# Patient Record
Sex: FEMALE | Race: WHITE | Employment: UNEMPLOYED | URBAN - METROPOLITAN AREA
[De-identification: names, ages, dates, MRNs, and addresses within clinical notes are randomized per-mention and may not be internally consistent; named-entity substitution may affect disease eponyms.]

---

## 2020-12-01 ENCOUNTER — HOSPITAL ENCOUNTER (OUTPATIENT)
Dept: LAB | Age: 50
Discharge: HOME | End: 2020-12-01

## 2020-12-01 PROCEDURE — 88312 SPECIAL STAINS GROUP 1: CPT

## 2020-12-01 PROCEDURE — 88305 TISSUE EXAM BY PATHOLOGIST: CPT

## 2021-09-07 ENCOUNTER — HOSPITAL ENCOUNTER (OUTPATIENT)
Dept: ULTRASOUND IMAGING | Age: 51
Discharge: HOME | End: 2021-09-07
Attending: UROLOGY
Payer: MEDICARE

## 2021-09-07 DIAGNOSIS — N28.1 RENAL CYST, RIGHT: ICD-10-CM

## 2021-09-07 PROCEDURE — 76770 US EXAM ABDO BACK WALL COMP: CPT

## 2022-07-09 ENCOUNTER — TELEPHONE ENCOUNTER (OUTPATIENT)
Dept: URBAN - METROPOLITAN AREA CLINIC 121 | Facility: CLINIC | Age: 52
End: 2022-07-09

## 2022-07-09 RX ORDER — LAMOTRIGINE 100 MG/1
TABLET ORAL
Refills: 0 | OUTPATIENT
Start: 2017-03-20 | End: 2017-05-11

## 2022-07-09 RX ORDER — LURASIDONE HYDROCHLORIDE 60 MG/1
TABLET, FILM COATED ORAL ONCE A DAY
Refills: 0 | OUTPATIENT
Start: 2017-03-20 | End: 2017-05-11

## 2022-07-09 RX ORDER — PROPRANOLOL HYDROCHLORIDE 20 MG/1
TABLET ORAL TWICE A DAY
Refills: 0 | OUTPATIENT
Start: 2016-01-08 | End: 2016-03-07

## 2022-07-09 RX ORDER — LAMOTRIGINE 100 MG/1
TABLET ORAL
Refills: 0 | OUTPATIENT
Start: 2016-03-07 | End: 2017-03-20

## 2022-07-09 RX ORDER — ZOLPIDEM TARTRATE 10 MG
1 HS TABLET ORAL ONCE A DAY
Refills: 0 | OUTPATIENT
Start: 2017-05-11 | End: 2017-06-28

## 2022-07-09 RX ORDER — PROMETHAZINE HYDROCHLORIDE 12.5 MG/1
TAKE ONE TABLET PO TWICE A DAY AS NEEDED TABLET ORAL TWICE A DAY
Refills: 0 | OUTPATIENT
Start: 2016-01-08 | End: 2017-06-28

## 2022-07-09 RX ORDER — PROPRANOLOL HYDROCHLORIDE 20 MG/1
TABLET ORAL TWICE A DAY
Refills: 0 | OUTPATIENT
Start: 2017-03-20 | End: 2017-06-28

## 2022-07-09 RX ORDER — LAMOTRIGINE 300 MG/1
TABLET, FILM COATED, EXTENDED RELEASE ORAL ONCE A DAY
Refills: 0 | OUTPATIENT
Start: 2016-01-08 | End: 2016-03-07

## 2022-07-09 RX ORDER — HYDROXYZINE PAMOATE 50 MG/1
CAPSULE ORAL AS NEEDED
Refills: 0 | OUTPATIENT
Start: 2017-03-20 | End: 2017-05-11

## 2022-07-09 RX ORDER — HYDROXYZINE PAMOATE 50 MG/1
CAPSULE ORAL THREE TIMES A DAY
Refills: 0 | OUTPATIENT
Start: 2017-05-11 | End: 2017-06-28

## 2022-07-09 RX ORDER — CHLORDIAZEPOXIDE HYDROCHLORIDE AND CLIDINIUM BROMIDE 5; 2.5 MG/1; MG/1
TAKE ONE CAPSULE PO TWICE A DAY CAPSULE, GELATIN COATED ORAL TWICE A DAY
Refills: 0 | OUTPATIENT
Start: 2016-01-08 | End: 2016-01-08

## 2022-07-09 RX ORDER — ALBUTEROL SULFATE 108 UG/1
AEROSOL, METERED RESPIRATORY (INHALATION) AS NEEDED
Refills: 0 | OUTPATIENT
Start: 2017-05-11 | End: 2017-06-28

## 2022-07-09 RX ORDER — ZOLPIDEM TARTRATE 10 MG
1 HS TABLET ORAL
Refills: 0 | OUTPATIENT
Start: 2017-03-20 | End: 2017-05-11

## 2022-07-09 RX ORDER — FLUTICASONE PROPIONATE 50 UG/1
SPRAY, METERED NASAL ONCE A DAY
Refills: 0 | OUTPATIENT
Start: 2017-05-11 | End: 2017-06-28

## 2022-07-09 RX ORDER — CHLORTHALIDONE 25 MG/1
TABLET ORAL ONCE A DAY
Refills: 0 | OUTPATIENT
Start: 2017-05-11 | End: 2017-06-28

## 2022-07-09 RX ORDER — ZOLPIDEM TARTRATE 10 MG
1 HS TABLET ORAL
Refills: 0 | OUTPATIENT
Start: 2016-03-07 | End: 2017-03-20

## 2022-07-09 RX ORDER — LAMOTRIGINE 300 MG/1
TABLET, FILM COATED, EXTENDED RELEASE ORAL ONCE A DAY
Refills: 0 | OUTPATIENT
Start: 2015-10-30 | End: 2016-01-08

## 2022-07-09 RX ORDER — LAMOTRIGINE 300 MG/1
TABLET, FILM COATED, EXTENDED RELEASE ORAL ONCE A DAY
Refills: 0 | OUTPATIENT
Start: 2016-03-07 | End: 2017-03-20

## 2022-07-09 RX ORDER — DICYCLOMINE HYDROCHLORIDE 20 MG/1
TAKE ONE TABLET PO FOUR TIMES A DAY TABLET ORAL
Refills: 0 | OUTPATIENT
Start: 2016-05-09 | End: 2017-03-20

## 2022-07-09 RX ORDER — ZOLPIDEM TARTRATE 10 MG
1 HS TABLET ORAL
Refills: 0 | OUTPATIENT
Start: 2016-01-08 | End: 2016-03-07

## 2022-07-09 RX ORDER — LAMOTRIGINE 100 MG/1
TABLET ORAL
Refills: 0 | OUTPATIENT
Start: 2016-01-08 | End: 2016-03-07

## 2022-07-09 RX ORDER — LAMOTRIGINE 300 MG/1
TABLET, FILM COATED, EXTENDED RELEASE ORAL ONCE A DAY
Refills: 0 | OUTPATIENT
Start: 2017-03-20 | End: 2017-03-20

## 2022-07-09 RX ORDER — PROPRANOLOL HYDROCHLORIDE 20 MG/1
TABLET ORAL TWICE A DAY
Refills: 0 | OUTPATIENT
Start: 2015-10-30 | End: 2016-01-08

## 2022-07-09 RX ORDER — LURASIDONE HYDROCHLORIDE 60 MG/1
TABLET, FILM COATED ORAL ONCE A DAY
Refills: 0 | OUTPATIENT
Start: 2017-05-11 | End: 2017-06-28

## 2022-07-09 RX ORDER — CLONAZEPAM 0.5 MG/1
TABLET ORAL ONCE A DAY
Refills: 0 | OUTPATIENT
Start: 2017-03-20 | End: 2017-05-11

## 2022-07-09 RX ORDER — PROPRANOLOL HYDROCHLORIDE 20 MG/1
TABLET ORAL TWICE A DAY
Refills: 0 | OUTPATIENT
Start: 2016-03-07 | End: 2017-03-20

## 2022-07-09 RX ORDER — CLONAZEPAM 0.5 MG/1
TABLET ORAL THREE TIMES A DAY
Refills: 0 | OUTPATIENT
Start: 2017-05-11 | End: 2017-06-28

## 2022-07-09 RX ORDER — LAMOTRIGINE 200 MG/1
TABLET ORAL TWICE A DAY
Refills: 0 | OUTPATIENT
Start: 2017-05-11 | End: 2017-06-28

## 2022-07-10 ENCOUNTER — TELEPHONE ENCOUNTER (OUTPATIENT)
Dept: URBAN - METROPOLITAN AREA CLINIC 121 | Facility: CLINIC | Age: 52
End: 2022-07-10

## 2022-07-10 RX ORDER — PROPRANOLOL HYDROCHLORIDE 20 MG/1
TABLET ORAL TWICE A DAY
Refills: 0 | Status: ACTIVE | COMMUNITY
Start: 2017-06-28

## 2022-07-10 RX ORDER — DICYCLOMINE HYDROCHLORIDE 10 MG/1
TAKE ONE CAPSULE PO THREE TIMES A DAY AS NEEDED CAPSULE ORAL THREE TIMES A DAY
Refills: 11 | Status: ACTIVE | COMMUNITY
Start: 2017-06-28

## 2022-07-10 RX ORDER — CHLORTHALIDONE 25 MG/1
TABLET ORAL ONCE A DAY
Refills: 0 | Status: ACTIVE | COMMUNITY
Start: 2017-06-28

## 2022-07-10 RX ORDER — LAMOTRIGINE 200 MG/1
TABLET ORAL TWICE A DAY
Refills: 0 | Status: ACTIVE | COMMUNITY
Start: 2017-06-28

## 2022-07-10 RX ORDER — ALBUTEROL SULFATE 108 UG/1
AEROSOL, METERED RESPIRATORY (INHALATION) AS NEEDED
Refills: 0 | Status: ACTIVE | COMMUNITY
Start: 2017-06-28

## 2022-07-10 RX ORDER — CHLORDIAZEPOXIDE HYDROCHLORIDE AND CLIDINIUM BROMIDE 5; 2.5 MG/1; MG/1
THREE TIMES A DAY CAPSULE ORAL THREE TIMES A DAY
Refills: 3 | Status: ACTIVE | COMMUNITY
Start: 2017-03-20

## 2022-07-10 RX ORDER — CHLORDIAZEPOXIDE HYDROCHLORIDE AND CLIDINIUM BROMIDE 5; 2.5 MG/1; MG/1
TWICE A DAY CAPSULE ORAL TWICE A DAY
Refills: 3 | Status: ACTIVE | COMMUNITY
Start: 2016-03-07

## 2022-07-10 RX ORDER — FLUCONAZOLE 100 MG/1
TAKE ONE TABLET PO ONCE A DAY FOR 3 WEEKS TABLET ORAL ONCE A DAY
Refills: 0 | Status: ACTIVE | COMMUNITY
Start: 2017-04-25

## 2022-07-10 RX ORDER — ZOLPIDEM TARTRATE 10 MG
1 HS TABLET ORAL ONCE A DAY
Refills: 0 | Status: ACTIVE | COMMUNITY
Start: 2017-06-28

## 2022-07-10 RX ORDER — HYDROXYZINE PAMOATE 50 MG/1
CAPSULE ORAL THREE TIMES A DAY
Refills: 0 | Status: ACTIVE | COMMUNITY
Start: 2017-06-28

## 2022-07-10 RX ORDER — FLUTICASONE PROPIONATE 50 UG/1
SPRAY, METERED NASAL ONCE A DAY
Refills: 0 | Status: ACTIVE | COMMUNITY
Start: 2017-06-28

## 2022-07-10 RX ORDER — LURASIDONE HYDROCHLORIDE 60 MG/1
TABLET, FILM COATED ORAL ONCE A DAY
Refills: 0 | Status: ACTIVE | COMMUNITY
Start: 2017-06-28

## 2022-07-10 RX ORDER — CHLORDIAZEPOXIDE HYDROCHLORIDE AND CLIDINIUM BROMIDE 5; 2.5 MG/1; MG/1
TAKE ONE CAPSULE PO TWICE A DAY CAPSULE, GELATIN COATED ORAL TWICE A DAY
Refills: 0 | Status: ACTIVE | COMMUNITY
Start: 2016-01-11

## 2022-07-10 RX ORDER — CLONAZEPAM 0.5 MG/1
TABLET ORAL THREE TIMES A DAY
Refills: 0 | Status: ACTIVE | COMMUNITY
Start: 2017-06-28

## 2022-09-22 ENCOUNTER — HOSPITAL ENCOUNTER (OUTPATIENT)
Dept: ULTRASOUND IMAGING | Age: 52
Discharge: HOME | End: 2022-09-25
Payer: MEDICARE

## 2022-09-22 DIAGNOSIS — N28.1 RENAL CYST, RIGHT: ICD-10-CM

## 2022-09-22 PROCEDURE — 76770 US EXAM ABDO BACK WALL COMP: CPT

## 2023-04-28 ENCOUNTER — OFFICE VISIT (OUTPATIENT)
Dept: UROLOGY | Age: 53
End: 2023-04-28
Payer: MEDICARE

## 2023-04-28 DIAGNOSIS — N28.1 CYST OF KIDNEY, ACQUIRED: ICD-10-CM

## 2023-04-28 DIAGNOSIS — N32.81 OVERACTIVE BLADDER: Primary | ICD-10-CM

## 2023-04-28 LAB
BILIRUBIN, URINE, POC: NEGATIVE
BLOOD URINE, POC: NORMAL
GLUCOSE URINE, POC: >=1000
KETONES, URINE, POC: NORMAL
LEUKOCYTE ESTERASE, URINE, POC: NEGATIVE
NITRITE, URINE, POC: NEGATIVE
PH, URINE, POC: 5 (ref 4.6–8)
PROTEIN,URINE, POC: NORMAL
SPECIFIC GRAVITY, URINE, POC: 1.02 (ref 1–1.03)
URINALYSIS CLARITY, POC: NORMAL
URINALYSIS COLOR, POC: NORMAL
UROBILINOGEN, POC: NORMAL

## 2023-04-28 PROCEDURE — 4004F PT TOBACCO SCREEN RCVD TLK: CPT | Performed by: UROLOGY

## 2023-04-28 PROCEDURE — 81003 URINALYSIS AUTO W/O SCOPE: CPT | Performed by: UROLOGY

## 2023-04-28 PROCEDURE — 99213 OFFICE O/P EST LOW 20 MIN: CPT | Performed by: UROLOGY

## 2023-04-28 PROCEDURE — G8421 BMI NOT CALCULATED: HCPCS | Performed by: UROLOGY

## 2023-04-28 PROCEDURE — G8427 DOCREV CUR MEDS BY ELIG CLIN: HCPCS | Performed by: UROLOGY

## 2023-04-28 PROCEDURE — 3017F COLORECTAL CA SCREEN DOC REV: CPT | Performed by: UROLOGY

## 2023-04-28 ASSESSMENT — ENCOUNTER SYMPTOMS
BACK PAIN: 0
NAUSEA: 0

## 2023-04-28 NOTE — PROGRESS NOTES
frequency/urgency. Past Medical History:   Diagnosis Date    Diabetes ([facility] [address]     GERD (gastroesophageal reflux disease)     Hypercholesterolemia     Hypertension     Kidney disease     Psychiatric disorder     Depression, Anxiety     Past Surgical History:   Procedure Laterality Date    CHOLECYSTECTOMY      GI      lysis of adhesions    GYN      endometrial ablation, 2 c-sections, tubal ligation     Current Outpatient Medications   Medication Sig Dispense Refill    mirabegron (MYRBETRIQ) 50 MG TB24 Take 50 mg by mouth daily 30 tablet 11    atorvastatin (LIPITOR) 20 MG tablet Take 1 tablet by mouth      ergocalciferol (ERGOCALCIFEROL) 1.25 MG (88053 UT) capsule Take 1 capsule by mouth every 7 days      fluticasone-salmeterol (ADVAIR) 250-50 MCG/DOSE AEPB Inhale 1 puff into the lungs      hydrOXYzine (ATARAX) 50 MG tablet Take 50 mg by mouth 3 times daily as needed      Hyoscyamine Sulfate SL 0.125 MG SUBL Take 1-2 by Sublingual route 2 times every day prn      lamoTRIgine (LAMICTAL) 200 MG tablet Take 200 mg by mouth 2 times daily      mirabegron (MYRBETRIQ) 50 MG TB24 Take 50 mg by mouth daily      omeprazole (PRILOSEC) 40 MG delayed release capsule Take 40 mg by mouth daily      oxybutynin (DITROPAN-XL) 10 MG extended release tablet Take 10 mg by mouth daily      pioglitazone (ACTOS) 15 MG tablet Take 1 tablet by mouth      promethazine (PHENERGAN) 25 MG tablet Take 25 mg by mouth every 6 hours as needed      propranolol (INDERAL) 20 MG tablet Take by mouth 3 times daily      solifenacin (VESICARE) 10 MG tablet Take 10 mg by mouth daily      spironolactone (ALDACTONE) 25 MG tablet Take by mouth daily      trospium (SANCTURA) 60 MG CP24 extended release capsule Take 60 mg by mouth every morning (before breakfast)      ziprasidone (GEODON) 60 MG capsule Take 60 mg by mouth 2 times daily (with meals)       No current facility-administered medications for this visit.      Allergies   Allergen Reactions    Clindamycin

## 2023-10-28 ENCOUNTER — HOSPITAL ENCOUNTER (OUTPATIENT)
Dept: ULTRASOUND IMAGING | Age: 53
End: 2023-10-28
Attending: UROLOGY
Payer: MEDICARE

## 2023-10-28 DIAGNOSIS — N28.1 CYST OF KIDNEY, ACQUIRED: ICD-10-CM

## 2023-10-28 PROCEDURE — 76770 US EXAM ABDO BACK WALL COMP: CPT

## 2023-10-30 ENCOUNTER — OFFICE VISIT (OUTPATIENT)
Dept: UROLOGY | Age: 53
End: 2023-10-30
Payer: MEDICARE

## 2023-10-30 DIAGNOSIS — N32.81 OVERACTIVE BLADDER: Primary | ICD-10-CM

## 2023-10-30 DIAGNOSIS — N20.0 KIDNEY STONE: ICD-10-CM

## 2023-10-30 DIAGNOSIS — N39.46 MIXED INCONTINENCE: ICD-10-CM

## 2023-10-30 DIAGNOSIS — N28.1 CYST OF KIDNEY, ACQUIRED: ICD-10-CM

## 2023-10-30 LAB
BILIRUBIN, URINE, POC: NEGATIVE
BLOOD URINE, POC: NEGATIVE
GLUCOSE URINE, POC: >=1000
KETONES, URINE, POC: NEGATIVE
LEUKOCYTE ESTERASE, URINE, POC: NEGATIVE
NITRITE, URINE, POC: NEGATIVE
PH, URINE, POC: 5.5 (ref 4.6–8)
PROTEIN,URINE, POC: NEGATIVE
SPECIFIC GRAVITY, URINE, POC: 1.02 (ref 1–1.03)
URINALYSIS CLARITY, POC: NORMAL
URINALYSIS COLOR, POC: NORMAL
UROBILINOGEN, POC: NORMAL

## 2023-10-30 PROCEDURE — 4004F PT TOBACCO SCREEN RCVD TLK: CPT | Performed by: UROLOGY

## 2023-10-30 PROCEDURE — G8428 CUR MEDS NOT DOCUMENT: HCPCS | Performed by: UROLOGY

## 2023-10-30 PROCEDURE — G8484 FLU IMMUNIZE NO ADMIN: HCPCS | Performed by: UROLOGY

## 2023-10-30 PROCEDURE — G8421 BMI NOT CALCULATED: HCPCS | Performed by: UROLOGY

## 2023-10-30 PROCEDURE — 74018 RADEX ABDOMEN 1 VIEW: CPT | Performed by: UROLOGY

## 2023-10-30 PROCEDURE — 81003 URINALYSIS AUTO W/O SCOPE: CPT | Performed by: UROLOGY

## 2023-10-30 PROCEDURE — 3017F COLORECTAL CA SCREEN DOC REV: CPT | Performed by: UROLOGY

## 2023-10-30 PROCEDURE — 99213 OFFICE O/P EST LOW 20 MIN: CPT | Performed by: UROLOGY

## 2023-10-30 ASSESSMENT — ENCOUNTER SYMPTOMS
NAUSEA: 0
BACK PAIN: 0

## 2023-10-30 NOTE — PROGRESS NOTES
spironolactone (ALDACTONE) 25 MG tablet Take by mouth daily      trospium (SANCTURA) 60 MG CP24 extended release capsule Take 60 mg by mouth every morning (before breakfast)      ziprasidone (GEODON) 60 MG capsule Take 60 mg by mouth 2 times daily (with meals)       No current facility-administered medications for this visit. Allergies   Allergen Reactions    Clindamycin Hives and Other (See Comments)    Doxycycline Other (See Comments)    Penicillins Hives     Social History     Socioeconomic History    Marital status:      Spouse name: Not on file    Number of children: Not on file    Years of education: Not on file    Highest education level: Not on file   Occupational History    Not on file   Tobacco Use    Smoking status: Former     Packs/day: .5     Types: Cigarettes    Smokeless tobacco: Never   Substance and Sexual Activity    Alcohol use: Not Currently     Alcohol/week: 1.7 standard drinks of alcohol    Drug use: Never    Sexual activity: Not on file   Other Topics Concern    Not on file   Social History Narrative    Not on file     Social Determinants of Health     Financial Resource Strain: Not on file   Food Insecurity: Not on file   Transportation Needs: Not on file   Physical Activity: Not on file   Stress: Not on file   Social Connections: Not on file   Intimate Partner Violence: Not on file   Housing Stability: Not on file     Family History   Problem Relation Age of Onset    Heart Disease Father        Review of Systems  Constitutional:   Negative for fever. GI:  Negative for nausea. Musculoskeletal:  Negative for back pain. There were no vitals taken for this visit. PE: pelvic : PVR 9, urethra: not much mobility with cough.    Urinalysis  UA - Dipstick  Results for orders placed or performed in visit on 10/30/23   AMB POC URINALYSIS DIP STICK AUTO W/O MICRO   Result Value Ref Range    Color (UA POC)      Clarity (UA POC)      Glucose, Urine, POC >=1000 Negative    Bilirubin,

## 2024-04-01 ENCOUNTER — OFFICE VISIT (OUTPATIENT)
Dept: UROLOGY | Age: 54
End: 2024-04-01
Payer: MEDICARE

## 2024-04-01 DIAGNOSIS — N28.1 CYST OF KIDNEY, ACQUIRED: ICD-10-CM

## 2024-04-01 DIAGNOSIS — N32.81 OVERACTIVE BLADDER: Primary | ICD-10-CM

## 2024-04-01 DIAGNOSIS — N39.46 MIXED INCONTINENCE: ICD-10-CM

## 2024-04-01 DIAGNOSIS — N20.0 KIDNEY STONE: ICD-10-CM

## 2024-04-01 LAB
BILIRUBIN, URINE, POC: NEGATIVE
BLOOD URINE, POC: NEGATIVE
GLUCOSE URINE, POC: 500
KETONES, URINE, POC: NEGATIVE
LEUKOCYTE ESTERASE, URINE, POC: NORMAL
NITRITE, URINE, POC: NEGATIVE
PH, URINE, POC: 5.5 (ref 4.6–8)
PROTEIN,URINE, POC: NEGATIVE
SPECIFIC GRAVITY, URINE, POC: 1.02 (ref 1–1.03)
URINALYSIS CLARITY, POC: NORMAL
URINALYSIS COLOR, POC: NORMAL
UROBILINOGEN, POC: NORMAL

## 2024-04-01 PROCEDURE — G8421 BMI NOT CALCULATED: HCPCS | Performed by: UROLOGY

## 2024-04-01 PROCEDURE — 4004F PT TOBACCO SCREEN RCVD TLK: CPT | Performed by: UROLOGY

## 2024-04-01 PROCEDURE — G8427 DOCREV CUR MEDS BY ELIG CLIN: HCPCS | Performed by: UROLOGY

## 2024-04-01 PROCEDURE — 3017F COLORECTAL CA SCREEN DOC REV: CPT | Performed by: UROLOGY

## 2024-04-01 PROCEDURE — 99213 OFFICE O/P EST LOW 20 MIN: CPT | Performed by: UROLOGY

## 2024-04-01 PROCEDURE — 81003 URINALYSIS AUTO W/O SCOPE: CPT | Performed by: UROLOGY

## 2024-04-01 ASSESSMENT — ENCOUNTER SYMPTOMS
BACK PAIN: 0
NAUSEA: 0

## 2024-04-01 NOTE — PROGRESS NOTES
[facility]  [address] [place] [id number]  [phone]    Nena Rasheed  : 1970    Chief Complaint   Patient presents with    Follow-up        HPI     Nena Rasheed is a 53 y.o. female    hx of renal cyst and OAB,   she is disabled on the basis of bipolar disorder.    Referred by [facility] for evaluation and treatment of renal cyst. JOSE at [facility] in  showed minimally complex renal cyst.   Renal US 20: FINDINGS:  .  Right kidney: Normal size, contour and echogenicity. No perinephric fluid or hydronephrosis. There is a minimally complex right renal cyst containing a single thin septation and wall calcification measuring 1.2 cm. Length = 10.3 cm.  .  Left kidney: Normal size, contour and echogenicity. No perinephric fluid or hydronephrosis. No focal masses are identified. Length = 10.7 cm.  .  Bladder: Normal.    . Additional comments: None.    IMPRESSION:   Minimally complex right renal cyst.     No hematuria or problems voiding .she does c/o frequency and urgency.   She has type II diabetes.   Started on [place] in . Insurance wouldn't pay.   Renal US : IMPRESSION  Grossly unchanged 1.3 cm septated right renal cyst.     JOSE 22: Findings:       The right kidney measures 10.1 cm and is normal in size and contour.  There is a  small cyst in the upper pole or calyceal dilatation measuring 1.5 x 1.1 x 1.0 cm  with an adjacent 6 mm stone.  No evidence of hydronephrosis is seen.       The left kidney measures 10.6 cm and is normal in size and contour.  There is no  evidence of solid or cystic lesion.  No evidence of hydronephrosis is seen.   There are no shadowing stones.     The urinary bladder appears unremarkable.      IMPRESSION:     1. Stable small cyst or calyceal dilatation in right upper renal pole with  adjacent small stone.  She wants something for her bladder. Insurance cover Myrbetriq. Ditropan caused dry mouth.   She has